# Patient Record
Sex: MALE | Race: WHITE | NOT HISPANIC OR LATINO | Employment: FULL TIME | URBAN - METROPOLITAN AREA
[De-identification: names, ages, dates, MRNs, and addresses within clinical notes are randomized per-mention and may not be internally consistent; named-entity substitution may affect disease eponyms.]

---

## 2024-02-20 ENCOUNTER — HOSPITAL ENCOUNTER (EMERGENCY)
Facility: HOSPITAL | Age: 24
Discharge: HOME | End: 2024-02-20
Payer: COMMERCIAL

## 2024-02-20 ENCOUNTER — APPOINTMENT (OUTPATIENT)
Dept: RADIOLOGY | Facility: HOSPITAL | Age: 24
End: 2024-02-20
Payer: COMMERCIAL

## 2024-02-20 ENCOUNTER — APPOINTMENT (OUTPATIENT)
Dept: CARDIOLOGY | Facility: HOSPITAL | Age: 24
End: 2024-02-20
Payer: COMMERCIAL

## 2024-02-20 VITALS
OXYGEN SATURATION: 100 % | HEART RATE: 88 BPM | TEMPERATURE: 98.7 F | RESPIRATION RATE: 18 BRPM | BODY MASS INDEX: 26.41 KG/M2 | WEIGHT: 195 LBS | DIASTOLIC BLOOD PRESSURE: 84 MMHG | HEIGHT: 72 IN | SYSTOLIC BLOOD PRESSURE: 139 MMHG

## 2024-02-20 DIAGNOSIS — R07.9 CHEST PAIN, UNSPECIFIED TYPE: Primary | ICD-10-CM

## 2024-02-20 LAB — D DIMER PPP FEU-MCNC: <215 NG/ML FEU

## 2024-02-20 PROCEDURE — 71046 X-RAY EXAM CHEST 2 VIEWS: CPT

## 2024-02-20 PROCEDURE — 85379 FIBRIN DEGRADATION QUANT: CPT | Performed by: NURSE PRACTITIONER

## 2024-02-20 PROCEDURE — 93005 ELECTROCARDIOGRAM TRACING: CPT

## 2024-02-20 PROCEDURE — 36415 COLL VENOUS BLD VENIPUNCTURE: CPT | Performed by: NURSE PRACTITIONER

## 2024-02-20 PROCEDURE — 71046 X-RAY EXAM CHEST 2 VIEWS: CPT | Mod: FOREIGN READ | Performed by: RADIOLOGY

## 2024-02-20 PROCEDURE — 99283 EMERGENCY DEPT VISIT LOW MDM: CPT | Mod: 25

## 2024-02-20 ASSESSMENT — PAIN DESCRIPTION - LOCATION: LOCATION: CHEST

## 2024-02-20 ASSESSMENT — PAIN SCALES - GENERAL: PAINLEVEL_OUTOF10: 4

## 2024-02-20 ASSESSMENT — COLUMBIA-SUICIDE SEVERITY RATING SCALE - C-SSRS
2. HAVE YOU ACTUALLY HAD ANY THOUGHTS OF KILLING YOURSELF?: NO
1. IN THE PAST MONTH, HAVE YOU WISHED YOU WERE DEAD OR WISHED YOU COULD GO TO SLEEP AND NOT WAKE UP?: NO
6. HAVE YOU EVER DONE ANYTHING, STARTED TO DO ANYTHING, OR PREPARED TO DO ANYTHING TO END YOUR LIFE?: NO

## 2024-02-20 ASSESSMENT — PAIN - FUNCTIONAL ASSESSMENT: PAIN_FUNCTIONAL_ASSESSMENT: 0-10

## 2024-02-20 NOTE — ED PROVIDER NOTES
HPI   Chief Complaint   Patient presents with    Chest pain radiating into L arm; dizziness       Colton is a 23 year old male with a history of heart burn, anxiety and ADHD for which he is not on any medication for at this time. He'll take nexium as needed.    He presents after experiencing about 10 minutes of heart palpations, heart racing, light headedness, left arm felt heavy, and his tameka were clammy. He told his dad about this, his dad has similar symptoms with his bad heart burn and told the patient he should go to the ED. At the ED he is not experiencing any of the symptoms. He has similar episodes to this in the past, not lasting as long, staring about 6 months ago. He states he has had adequate oral intake of food and fluids. He admits to occasional cocaine use, first using about 6 months ago, last using 3 days ago. He also uses nicotine patches which is what he attributes the palpitations to. He denies any SOB, chest pain, vision changes, increase in stress, nausea, vomiting, bowel or urinary changes, fever, chills, congestion or cough.      History provided by:  Patient   used: No                        [place] Coma Scale Score: 15                     Patient History   Past Medical History:   Diagnosis Date    ADHD     Anxiety     Depression      Past Surgical History:   Procedure Laterality Date    TONSILLECTOMY       No family history on file.  Social History     Tobacco Use    Smoking status: Never    Smokeless tobacco: Former    Tobacco comments:     Currently uses Zyn   Vaping Use    Vaping Use: Former   Substance Use Topics    Alcohol use: Yes     Comment: Occasional    Drug use: Never       Physical Exam   ED Triage Vitals [02/20/24 1109]   Temperature Heart Rate Respirations BP   37.1 °C (98.7 °F) 88 18 139/84      Pulse Ox Temp src Heart Rate Source Patient Position   100 % -- -- --      BP Location FiO2 (%)     -- --       Physical Exam  Vitals and nursing note reviewed.    Constitutional:       Appearance: He is normal weight.   HENT:      Head: Normocephalic.      Right Ear: Tympanic membrane normal.      Left Ear: Tympanic membrane normal.      Mouth/Throat:      Mouth: Mucous membranes are moist.      Pharynx: Posterior oropharyngeal erythema present. No oropharyngeal exudate.   Eyes:      Extraocular Movements: Extraocular movements intact.      Conjunctiva/sclera: Conjunctivae normal.      Pupils: Pupils are equal, round, and reactive to light.   Cardiovascular:      Rate and Rhythm: Normal rate and regular rhythm.      Pulses: Normal pulses.      Heart sounds: Normal heart sounds.   Pulmonary:      Effort: Pulmonary effort is normal.      Breath sounds: Normal breath sounds.   Abdominal:      General: Abdomen is flat.      Palpations: Abdomen is soft.   Musculoskeletal:         General: Normal range of motion.      Cervical back: Normal range of motion.   Skin:     General: Skin is warm and dry.      Capillary Refill: Capillary refill takes less than 2 seconds.   Neurological:      General: No focal deficit present.      Mental Status: He is alert.   Psychiatric:         Mood and Affect: Mood normal.         ED Course & MDM   ED Course as of 02/20/24 1340   e Feb 20, 2024   1335 ECG 12 lead  Sinus arrhythmia at a rate of 89.  LA interval is 140, QRS is 107, QT is 375, QTc is 457.  Normal axis.  Normal interval.  No acute ischemia or injury pattern noted.  EKG interpreted by me. [CT]      ED Course User Index  [CT] HAYLEY Ayala         Diagnoses as of 02/20/24 1340   Chest pain, unspecified type       Medical Decision Making  The physician assistant student  was personally supervised by me, Chiquita Brito CNP, during the physical examination.  I personally performed a physical exam and medical decision making.  I have made appropriate changes to the documentation and assessment and plan based on my verification, exam, and medical decision making.    Patient is  presenting to the emergency room with complaints of chest pain and dizziness.  Differential diagnosis includes arrhythmia, viral illness, ACS, pulmonary embolism, pneumonia, electrolyte imbalance, illicit drug use, or other acute process.  The patient's vital signs were stable.  He did not appear to be in any acute distress.  A twelve-lead EKG was obtained and was negative for acute injury.  The patient does have sinus arrhythmia.  An x-ray of the chest was performed and showed no acute cardiopulmonary process.  A D-dimer was obtained and was negative.  We have a very low suspicion for ACS, pulmonary embolism, pneumonia, electrolyte imbalance, or other acute process.  We believe that the patient's symptoms are most likely as a result of anxiety and his stimulant drug use.  Patient was advised to cased continue using the cocaine.  He is to follow-up with his primary care physician in the next 2 to 3 days.  The patient will be referred to cardiology for further evaluation and treatment.  It is possible that the patient will need to wear a Holter monitor to assess for any arrhythmias.  Patient was discharged in stable condition with computer discharge instructions given.  He is to return if worse in any way.        Procedure  Procedures     NERY Ayala-CNP  02/20/24 1336       NERY Ayala-CNP  02/20/24 1336       NERY Ayala-CNP  02/20/24 1341

## 2024-02-24 LAB
ATRIAL RATE: 97 BPM
P AXIS: 67 DEGREES
PR INTERVAL: 140 MS
Q ONSET: 249 MS
QRS COUNT: 14 BEATS
QRS DURATION: 107 MS
QT INTERVAL: 375 MS
QTC CALCULATION(BAZETT): 457 MS
QTC FREDERICIA: 427 MS
R AXIS: 44 DEGREES
T AXIS: 41 DEGREES
T OFFSET: 437 MS
VENTRICULAR RATE: 89 BPM

## 2024-03-07 PROBLEM — H10.30 ACUTE CONJUNCTIVITIS: Status: ACTIVE | Noted: 2024-03-07

## 2024-03-07 PROBLEM — R07.9 CHEST PAIN: Status: ACTIVE | Noted: 2024-03-07

## 2024-03-07 RX ORDER — FLUOXETINE HYDROCHLORIDE 40 MG/1
1 CAPSULE ORAL EVERY 24 HOURS
COMMUNITY
Start: 2019-12-20 | End: 2024-03-12 | Stop reason: WASHOUT

## 2024-03-07 RX ORDER — DEXTROAMPHETAMINE SACCHARATE, AMPHETAMINE ASPARTATE, DEXTROAMPHETAMINE SULFATE AND AMPHETAMINE SULFATE 1.25; 1.25; 1.25; 1.25 MG/1; MG/1; MG/1; MG/1
5 TABLET ORAL EVERY 24 HOURS
COMMUNITY
Start: 2017-11-06 | End: 2024-03-12 | Stop reason: WASHOUT

## 2024-03-07 RX ORDER — MELOXICAM 15 MG/1
15 TABLET ORAL EVERY 24 HOURS
COMMUNITY
Start: 2024-01-17 | End: 2024-03-12 | Stop reason: WASHOUT

## 2024-03-07 RX ORDER — HYDROGEN PEROXIDE 3 %
1 SOLUTION, NON-ORAL MISCELLANEOUS EVERY 24 HOURS
COMMUNITY

## 2024-03-07 RX ORDER — LISDEXAMFETAMINE DIMESYLATE 40 MG/1
40 CAPSULE ORAL EVERY 24 HOURS
COMMUNITY
End: 2024-03-12 | Stop reason: WASHOUT

## 2024-03-12 ENCOUNTER — OFFICE VISIT (OUTPATIENT)
Dept: CARDIOLOGY | Facility: CLINIC | Age: 24
End: 2024-03-12
Payer: COMMERCIAL

## 2024-03-12 VITALS
BODY MASS INDEX: 26.41 KG/M2 | SYSTOLIC BLOOD PRESSURE: 112 MMHG | DIASTOLIC BLOOD PRESSURE: 70 MMHG | HEIGHT: 72 IN | WEIGHT: 195 LBS | HEART RATE: 83 BPM

## 2024-03-12 DIAGNOSIS — R00.2 PALPITATIONS: ICD-10-CM

## 2024-03-12 DIAGNOSIS — R42 DIZZINESS: ICD-10-CM

## 2024-03-12 DIAGNOSIS — R07.9 CHEST PAIN, UNSPECIFIED TYPE: Primary | ICD-10-CM

## 2024-03-12 PROCEDURE — 93000 ELECTROCARDIOGRAM COMPLETE: CPT | Performed by: INTERNAL MEDICINE

## 2024-03-12 PROCEDURE — 99204 OFFICE O/P NEW MOD 45 MIN: CPT | Performed by: INTERNAL MEDICINE

## 2024-03-12 PROCEDURE — 1036F TOBACCO NON-USER: CPT | Performed by: INTERNAL MEDICINE

## 2024-03-12 NOTE — PATIENT INSTRUCTIONS
Thanks for following up in office today.    1)  I do recommend that we do some testing.  I want you to wear a monitor to make sure that the symptoms were not caused by a fast rhythm.  I am also ordering an echocardiogram to see the strength of your heart, any valve issues or if you have a hole in your heart.    2)  I am also ordering a treadmill stress test for you.   If this testing comes back unrevealing then I want you to follow up with your PCP to look for other reasons.     3)  Please continue your cardiac medications as prescribed.    Follow up with me after testing   If you have any questions, please call [phone] and choose option for Dr. Vyas's nurse Blessing Clarke

## 2024-03-12 NOTE — PROGRESS NOTES
Referred by Dr. Townsend ref. provider found for post ER visit      History Of Present Illness:    Colton Faria is a 24 y.o. male presenting after ER visit. For chest pain.  H/o heartburn, ADHD, anxiety.    Patient tells me he used to take vyvanse and prozac 4 years ago.  He does take nexium every morning.    Presented to ED 2/20/24 - he was sitting at home working at his desk when he had acute onset palpitations, heart racing, LH, left arm heaviness, and clammy hands.  Checked his HR on his apple watch and it was elevated at 135bpm.  Dad brought him to the ED.  He did use some cocaine 3 days prior to this episode - has been using cocaine for 6 months.  In the past he has experienced heartburn with some of these sx, but during this episode he did not have overt heartburn.  Since then, no further episodes.    Fhx:  maternal GM with heart issues at older age.    SocHx:  Used to smoke weed but no longer.  Formerly vaped since high school - then switched to nicotine replacement under the lip - has been using these for 1.5 years.  drinks EtOH on weekends - maybe 10-20 beers throughout the day on Sat/Sun.  Cocaine use starting 6 mos ago.      Per ER note : Colton is a 23 year old male with a history of heart burn, anxiety and ADHD for which he is not on any medication for at this time. He'll take nexium as needed.     He presents after experiencing about 10 minutes of heart palpations, heart racing, light headedness, left arm felt heavy, and his tameka were clammy. He told his dad about this, his dad has similar symptoms with his bad heart burn and told the patient he should go to the ED. At the ED he is not experiencing any of the symptoms. He has similar episodes to this in the past, not lasting as long, staring about 6 months ago. He states he has had adequate oral intake of food and fluids. He admits to occasional cocaine use, first using about 6 months ago, last using 3 days ago. He also uses nicotine patches which  "is what he attributes the palpitations to. He denies any SOB, chest pain, vision changes, increase in stress, nausea, vomiting, bowel or urinary changes, fever, chills, congestion or cough.     Past Medical History:  He has a past medical history of ADHD, Anxiety, and Depression.    Past Surgical History:  He has a past surgical history that includes Tonsillectomy.      Social History:  He reports that he has never smoked. He has quit using smokeless tobacco. He reports current alcohol use. He reports that he does not use drugs.    Family History:  No family history on file.     Allergies:  Cat dander    Outpatient Medications:  Current Outpatient Medications   Medication Instructions    amphetamine-dextroamphetamine (Adderall) 5 mg tablet 5 mg, oral, Every 24 hours    esomeprazole (NexIUM) 20 mg DR capsule 1 capsule, oral, Every 24 hours    FLUoxetine (PROzac) 40 mg capsule 1 capsule, oral, Every 24 hours    lisdexamfetamine (VYVANSE) 40 mg, oral, Every 24 hours    meloxicam (MOBIC) 15 mg, oral, Every 24 hours        Last Recorded Vitals:  There were no vitals filed for this visit.    Physical Exam:  Physical Exam  HENT:      Head: Normocephalic.      Nose: Nose normal.   Cardiovascular:      Rate and Rhythm: Normal rate and regular rhythm.      Comments: No carotid bruits,   No significant murmurs   No leg swelling   Pulmonary:      Breath sounds: Normal breath sounds.   Abdominal:      Palpations: Abdomen is soft.   Musculoskeletal:         General: Normal range of motion.      Cervical back: Normal range of motion.   Skin:     General: Skin is warm and dry.   Neurological:      General: No focal deficit present.      Mental Status: He is alert.   Psychiatric:         Mood and Affect: Mood normal.              Last Labs:  CBC -  No results found for: \"WBC\", \"HGB\", \"HCT\", \"MCV\", \"PLT\"    CMP -  No results found for: \"CALCIUM\", \"PHOS\", \"PROT\", \"ALBUMIN\", \"AST\", \"ALT\", \"ALKPHOS\", \"BILITOT\"    LIPID PANEL -   No " "results found for: \"CHOL\", \"TRIG\", \"HDL\", \"CHHDL\", \"LDLF\", \"VLDL\", \"NHDL\"    RENAL FUNCTION PANEL -   No results found for: \"GLUCOSE\", \"NA\", \"K\", \"CL\", \"CO2\", \"ANIONGAP\", \"BUN\", \"CREATININE\", \"GFRMALE\", \"CALCIUM\", \"PHOS\", \"ALBUMIN\"     No results found for: \"BNP\", \"HGBA1C\"      Assessment/Plan   Atypical chest pain, left arm pain  Palpitations, tachycardia  LH/dizziness  H/o cocaine use  H/o nicotine replacement use  H/o GERD    Suspect symptoms were related to recent cocaine use and nicotine replacement use that morning, however would evaluate for other cardiac causes such as arrhythmia, congenital / structural dse / ischemia.    Plan:  -TTE  -holter monitor  -stress echo  -continue nexium    Follow up after testing          "

## 2024-03-13 ENCOUNTER — ANCILLARY PROCEDURE (OUTPATIENT)
Dept: CARDIOLOGY | Facility: CLINIC | Age: 24
End: 2024-03-13
Payer: COMMERCIAL

## 2024-03-13 DIAGNOSIS — R07.9 CHEST PAIN, UNSPECIFIED TYPE: ICD-10-CM

## 2024-03-13 DIAGNOSIS — R00.2 PALPITATIONS: ICD-10-CM

## 2024-03-13 DIAGNOSIS — R42 DIZZINESS: ICD-10-CM

## 2024-03-13 PROCEDURE — 93248 EXT ECG>7D<15D REV&INTERPJ: CPT | Performed by: INTERNAL MEDICINE

## 2024-03-13 PROCEDURE — 93246 EXT ECG>7D<15D RECORDING: CPT | Performed by: INTERNAL MEDICINE

## 2024-04-01 ENCOUNTER — HOSPITAL ENCOUNTER (OUTPATIENT)
Dept: CARDIOLOGY | Facility: HOSPITAL | Age: 24
Discharge: HOME | End: 2024-04-01
Payer: COMMERCIAL

## 2024-04-01 ENCOUNTER — APPOINTMENT (OUTPATIENT)
Dept: CARDIOLOGY | Facility: HOSPITAL | Age: 24
End: 2024-04-01
Payer: COMMERCIAL

## 2024-04-01 DIAGNOSIS — R07.9 CHEST PAIN, UNSPECIFIED TYPE: ICD-10-CM

## 2024-04-01 DIAGNOSIS — R42 DIZZINESS: ICD-10-CM

## 2024-04-01 DIAGNOSIS — R00.2 PALPITATIONS: ICD-10-CM

## 2024-04-01 PROCEDURE — 93306 TTE W/DOPPLER COMPLETE: CPT | Performed by: STUDENT IN AN ORGANIZED HEALTH CARE EDUCATION/TRAINING PROGRAM

## 2024-04-01 PROCEDURE — 93306 TTE W/DOPPLER COMPLETE: CPT

## 2024-04-02 LAB
EJECTION FRACTION APICAL 4 CHAMBER: 61.3
LEFT ATRIUM VOLUME AREA LENGTH INDEX BSA: 19.5 ML/M2
LEFT VENTRICLE INTERNAL DIMENSION DIASTOLE: 4.95 CM (ref 3.5–6)
LEFT VENTRICULAR OUTFLOW TRACT DIAMETER: 1.98 CM
LV EJECTION FRACTION BIPLANE: 61 %
MITRAL VALVE E/A RATIO: 1.59
MITRAL VALVE E/E' RATIO: 5.79
RIGHT VENTRICLE FREE WALL PEAK S': 15.41 CM/S
TRICUSPID ANNULAR PLANE SYSTOLIC EXCURSION: 2.5 CM

## 2024-04-10 PROBLEM — R42 DIZZINESS: Status: ACTIVE | Noted: 2024-04-10

## 2024-04-10 PROBLEM — R00.2 PALPITATIONS: Status: ACTIVE | Noted: 2024-04-10

## 2024-04-10 RX ORDER — CYCLOBENZAPRINE HCL 10 MG
10 TABLET ORAL 2 TIMES DAILY PRN
COMMUNITY
Start: 2024-01-05 | End: 2024-06-06 | Stop reason: WASHOUT

## 2024-04-12 ENCOUNTER — OFFICE VISIT (OUTPATIENT)
Dept: CARDIOLOGY | Facility: CLINIC | Age: 24
End: 2024-04-12
Payer: COMMERCIAL

## 2024-04-12 VITALS
DIASTOLIC BLOOD PRESSURE: 76 MMHG | BODY MASS INDEX: 26.41 KG/M2 | HEART RATE: 71 BPM | SYSTOLIC BLOOD PRESSURE: 124 MMHG | WEIGHT: 195 LBS | HEIGHT: 72 IN

## 2024-04-12 DIAGNOSIS — R07.9 CHEST PAIN, UNSPECIFIED TYPE: ICD-10-CM

## 2024-04-12 DIAGNOSIS — R42 DIZZINESS: ICD-10-CM

## 2024-04-12 DIAGNOSIS — R00.2 PALPITATIONS: ICD-10-CM

## 2024-04-12 PROCEDURE — 99214 OFFICE O/P EST MOD 30 MIN: CPT | Performed by: INTERNAL MEDICINE

## 2024-04-12 NOTE — PROGRESS NOTES
Chief Complaint:   No chief complaint on file.     History Of Present Illness:    Colton Faria is a 24 y.o. male presenting as follow up after testing.  H/o heartburn, ADHD, anxiety, prior cocaine use.  Initially seen due to episode of tachycardia  bpm, lightheadedness, left arm heaviness/clammy hands.    Colton has been doing better since prior visit.  No long episodes of tachycardia.  No chest pain.  No significant SOB.    We reviewed the results of his CV testing today including TTE which demonstrated normal LVEF 55-60%, negative bubble study, slightly enlarged RV, and no significant VHD.  Holter 14 days:  Result reviewed, rare PAC/pvc, one single episode of SVT lasting 5 beats.  20 triggered episodes - SR, SR with PAC/PVC, SVT.    ROS:  The remainder of the review of systems was obtained, as was negative as pertains to the chief complaint.    CV testing reviewed :  TTE 4/2024   EF 55-60% bubble study is negative , RV is slightly enlarged, trace tricuspid , pulmonic regurg,      Holter monitor  3/12-26/2024  Min heart rate 45  max 185  av 81, predominant underlying rhythm was SR.  One run of SVT lasting 5 beats with max rate of 126.  SVBT was detected within +/-45 seconds of symptomatic patient event.  SVE, Ve's<1%.      Prior hx:  Presented to ED 2/20/24 - he was sitting at home working at his desk when he had acute onset palpitations, heart racing, LH, left arm heaviness, and clammy hands.  Checked his HR on his apple watch and it was elevated at 135bpm.  Dad brought him to the ED.  He did use some cocaine 3 days prior to this episode - has been using cocaine for 6 months.  In the past he has experienced heartburn with some of these sx, but during this episode he did not have overt heartburn.  Since then, no further episodes.      Last Recorded Vitals:  There were no vitals filed for this visit.    Past Medical History:  He has a past medical history of ADHD, Anxiety, and Depression.    Past  "Surgical History:  He has a past surgical history that includes Tonsillectomy.      Social History:  He reports that he has never smoked. He has quit using smokeless tobacco. He reports current alcohol use. He reports that he does not use drugs.    Family History:  No family history on file.     Allergies:  Cat dander    Outpatient Medications:  Current Outpatient Medications   Medication Instructions    cyclobenzaprine (FLEXERIL) 10 mg, oral, 2 times daily PRN    esomeprazole (NexIUM) 20 mg DR capsule 1 capsule, oral, Every 24 hours       Physical Exam:  Physical Exam  HENT:      Head: Normocephalic.      Nose: Nose normal.      Mouth/Throat:      Mouth: Mucous membranes are moist.   Cardiovascular:      Rate and Rhythm: Normal rate and regular rhythm.      Comments: No carotid bruits   Trivial murmur LLSB  No leg swelling  Pulmonary:      Effort: Pulmonary effort is normal.      Breath sounds: Normal breath sounds.   Musculoskeletal:         General: Normal range of motion.      Cervical back: Normal range of motion.   Skin:     General: Skin is warm and dry.   Neurological:      Mental Status: He is alert.   Psychiatric:         Mood and Affect: Mood normal.            Last Labs:  CBC -  No results found for: \"WBC\", \"HGB\", \"HCT\", \"MCV\", \"PLT\"    CMP -  No results found for: \"CALCIUM\", \"PHOS\", \"PROT\", \"ALBUMIN\", \"AST\", \"ALT\", \"ALKPHOS\", \"BILITOT\"    LIPID PANEL -   No results found for: \"CHOL\", \"TRIG\", \"HDL\", \"CHHDL\", \"LDLF\", \"VLDL\", \"NHDL\"    RENAL FUNCTION PANEL -   No results found for: \"GLUCOSE\", \"NA\", \"K\", \"CL\", \"CO2\", \"ANIONGAP\", \"BUN\", \"CREATININE\", \"GFRMALE\", \"CALCIUM\", \"PHOS\", \"ALBUMIN\"     No results found for: \"BNP\", \"HGBA1C\"      Assessment/Plan   Atypical chest pain, left arm pain  Palpitations, tachycardia  LH/dizziness  H/o cocaine use  H/o nicotine replacement use  H/o GERD     Suspect symptoms were related to preceding cocaine use and nicotine replacement use that morning resulting in PAC/PVC's or " even pSVT.    Cardiac workup:  TTE with normal LVEF and no significant VHD.  Holter 14 days:  Result reviewed, rare PAC/pvc, one single episode of SVT lasting 5 beats.  20 triggered episodes - SR, SR with PAC/PVC, SVT.  Did not have stress echo.     Plan:  -we discussed vagal maneuvers for long/unremitting episodes of SVT  -advised cessation from caffeine  -advised against cocaine/amphetamine use  -to let us know of worsening sx

## 2024-04-12 NOTE — PATIENT INSTRUCTIONS
Thanks for following up in office today.    1)  We talked about the results of your echocardiogram and that your heart is strong and you had a negative bubble study which tells us you do not have a hole between the chambers of your heart.  You also had trivial enlargement of the right ventricle we usually see this if there is something going on with the lungs.  We will keep an eye on this.  If you smoke or vape stop.     2)  We talked about your monitor results and that you had a short run of SVT.  This is a  pretty benign rhythm.  It is well managed with vagal maneuvers.  You can splash cold water on your face,you can bear down like you are having a BM, you can cough .        3) I caution you to not use cocaine or amphetamines as this will contribute to episodes of SVT.  You should also cut back on your caffeine to help decrease the amount of SVT.  Monitor your symptoms and let us know if you are having any more /frequent episodes of SVT.      4) I also think that you should establish with a Primary Care Doctor for your gerd.  You really should not be on nexium for prolong amount of time without having the gerd investigated.     Follow up with  FRANCISCA SANCHEZ in  6  months  If you have any questions, please call [phone] and choose option for Dr. Vyas's nurse Blessing Clarke

## 2024-10-15 ENCOUNTER — APPOINTMENT (OUTPATIENT)
Dept: CARDIOLOGY | Facility: CLINIC | Age: 24
End: 2024-10-15
Payer: COMMERCIAL

## 2024-10-15 VITALS
HEART RATE: 91 BPM | HEIGHT: 72 IN | WEIGHT: 198.2 LBS | DIASTOLIC BLOOD PRESSURE: 84 MMHG | SYSTOLIC BLOOD PRESSURE: 138 MMHG | BODY MASS INDEX: 26.84 KG/M2

## 2024-10-15 DIAGNOSIS — R00.2 PALPITATIONS: Primary | ICD-10-CM

## 2024-10-15 DIAGNOSIS — I47.10 PAROXYSMAL SVT (SUPRAVENTRICULAR TACHYCARDIA) (CMS-HCC): ICD-10-CM

## 2024-10-15 PROCEDURE — 93010 ELECTROCARDIOGRAM REPORT: CPT | Performed by: INTERNAL MEDICINE

## 2024-10-15 PROCEDURE — 99214 OFFICE O/P EST MOD 30 MIN: CPT | Mod: 25 | Performed by: INTERNAL MEDICINE

## 2024-10-15 PROCEDURE — 93005 ELECTROCARDIOGRAM TRACING: CPT | Performed by: INTERNAL MEDICINE

## 2024-10-15 PROCEDURE — 3008F BODY MASS INDEX DOCD: CPT | Performed by: INTERNAL MEDICINE

## 2024-10-15 PROCEDURE — 99214 OFFICE O/P EST MOD 30 MIN: CPT | Performed by: INTERNAL MEDICINE

## 2024-10-15 NOTE — PATIENT INSTRUCTIONS
Thanks for following up in office today.    1)  Decrease your water intake to 64-72 ounces a day.    2)  I want you to really try to stop vaping.    3) If you have more palpitations let us know and we can mail you a monitor to wear.    4)  Please continue your cardiac medications as prescribed.    Follow up with me in one year  If you have any questions, please call [phone] and choose option for Dr. Vyas's nurse Blessing Clarke

## 2024-10-15 NOTE — PROGRESS NOTES
Chief Complaint:   No chief complaint on file.     History Of Present Illness:    Colton Faria is a 24 y.o. male presenting for 6 month follow up.  H/o heartburn, ADHD, anxiety, prior cocaine use.  Initially seen due to episode of tachycardia  bpm, lightheadedness, left arm heaviness/clammy hands.    Since last visit he has had a few episodes of palpitations, less than 10.  No associated dizziness with episodes.   Usually occurs when he lies down on his couch.  Tries to take a deep breath and it goes away.    EKG in office today demonstrates normal sinus rhythm.  Doing well from CV perspective.  No chest pain/pressure, SOB, LE edema, LH/dizziness, presyncope.  Tells me he is clean from cocaine.    Tells me he is drinking less EtOH.      ROS:  The remainder of the review of systems was obtained, as was negative as pertains to the chief complaint.    CV testing and labs :  TTE 4/2024   EF 55-60% bubble study is negative , RV is slightly enlarged, trace tricuspid , pulmonic regurg,       Holter monitor  3/12-26/2024  Min heart rate 45  max 185  av 81, predominant underlying rhythm was SR.  One run of SVT lasting 5 beats with max rate of 126.  SVT was detected within +/-45 seconds of symptomatic patient event.  SVE, Ve's<1%.       Prior hx:  Presented to ED 2/20/24 - he was sitting at home working at his desk when he had acute onset palpitations, heart racing, LH, left arm heaviness, and clammy hands.  Checked his HR on his apple watch and it was elevated at 135bpm.  Dad brought him to the ED.  He did use some cocaine 3 days prior to this episode - has been using cocaine for 6 months.  In the past he has experienced heartburn with some of these sx, but during this episode he did not have overt heartburn.  Since then, no further episodes.          Last Recorded Vitals:  There were no vitals filed for this visit.    Past Medical History:  He has a past medical history of ADHD, Anxiety, and  "Depression.    Past Surgical History:  He has a past surgical history that includes Tonsillectomy.      Social History:  He reports that he has never smoked. He has quit using smokeless tobacco. He reports current alcohol use. He reports that he does not use drugs.    Family History:  No family history on file.     Allergies:  Cat dander    Outpatient Medications:  Current Outpatient Medications   Medication Instructions    esomeprazole (NexIUM) 20 mg DR capsule 1 capsule, oral, Every 24 hours       Physical Exam:  Physical Exam  HENT:      Head: Normocephalic.      Nose: Nose normal.      Mouth/Throat:      Mouth: Mucous membranes are moist.   Cardiovascular:      Rate and Rhythm: Normal rate and regular rhythm.      Heart sounds: S1 normal and S2 normal.      Comments: No leg swelling    Pulmonary:      Effort: Pulmonary effort is normal.      Breath sounds: Normal breath sounds.   Abdominal:      General: Abdomen is flat.      Palpations: Abdomen is soft.   Musculoskeletal:         General: Normal range of motion.      Cervical back: Normal range of motion.   Skin:     General: Skin is warm and dry.   Neurological:      General: No focal deficit present.      Mental Status: He is alert.   Psychiatric:         Mood and Affect: Mood normal.            Last Labs:  CBC -  No results found for: \"WBC\", \"HGB\", \"HCT\", \"MCV\", \"PLT\"    CMP -  No results found for: \"CALCIUM\", \"PHOS\", \"PROT\", \"ALBUMIN\", \"AST\", \"ALT\", \"ALKPHOS\", \"BILITOT\"    LIPID PANEL -   No results found for: \"CHOL\", \"TRIG\", \"HDL\", \"CHHDL\", \"LDLF\", \"VLDL\", \"NHDL\"    RENAL FUNCTION PANEL -   No results found for: \"GLUCOSE\", \"NA\", \"K\", \"CL\", \"CO2\", \"ANIONGAP\", \"BUN\", \"CREATININE\", \"GFRMALE\", \"CALCIUM\", \"PHOS\", \"ALBUMIN\"     No results found for: \"BNP\", \"HGBA1C\"    No results found for this or any previous visit from the past 1095 days.            Assessment/Plan     Atypical chest pain, left arm pain  Palpitations, tachycardia  LH/dizziness  H/o cocaine " use  H/o nicotine replacement use  H/o GERD    Cardiac workup:  TTE with normal LVEF and no significant VHD.  Holter 14 days:  Result reviewed, rare PAC/pvc, one single episode of SVT lasting 5 beats.  20 triggered episodes - SR, SR with PAC/PVC, SVT.  Did not have stress echo.     Vaping every now and then.  No cocaine.      Plan:  -we discussed vagal maneuvers for long/unremitting episodes of SVT  -advised cessation from caffeine  -advised against cocaine/amphetamine use  -to let us know of worsening sx